# Patient Record
Sex: MALE | Race: WHITE | NOT HISPANIC OR LATINO | Employment: OTHER | URBAN - METROPOLITAN AREA
[De-identification: names, ages, dates, MRNs, and addresses within clinical notes are randomized per-mention and may not be internally consistent; named-entity substitution may affect disease eponyms.]

---

## 2018-02-16 NOTE — PATIENT DISCUSSION
causing dissimilar images not true diplopia DO NOT recommend Sx at this level of VA and patient age.  Observe.

## 2019-09-06 NOTE — PATIENT DISCUSSION
causing dissimilar images not true diplopia DO NOT recommend Sx at this level of VA and patient age.  Observe.  Pt agrees with this plan does NOT want Sx at this time.

## 2021-11-03 ENCOUNTER — NEW PATIENT COMPREHENSIVE (OUTPATIENT)
Dept: URBAN - METROPOLITAN AREA CLINIC 30 | Facility: CLINIC | Age: 51
End: 2021-11-03

## 2021-11-03 DIAGNOSIS — H04.203: ICD-10-CM

## 2021-11-03 DIAGNOSIS — H02.886: ICD-10-CM

## 2021-11-03 DIAGNOSIS — H02.883: ICD-10-CM

## 2021-11-03 DIAGNOSIS — H53.8: ICD-10-CM

## 2021-11-03 DIAGNOSIS — H35.09: ICD-10-CM

## 2021-11-03 PROCEDURE — 99204 OFFICE O/P NEW MOD 45 MIN: CPT

## 2021-11-03 PROCEDURE — 92250 FUNDUS PHOTOGRAPHY W/I&R: CPT

## 2021-11-03 PROCEDURE — 92015 DETERMINE REFRACTIVE STATE: CPT

## 2021-11-03 ASSESSMENT — KERATOMETRY
OS_K2POWER_DIOPTERS: 47.00
OS_AXISANGLE_DEGREES: 4
OD_K1POWER_DIOPTERS: 45.75
OS_AXISANGLE2_DEGREES: 94
OD_AXISANGLE2_DEGREES: 94
OD_K2POWER_DIOPTERS: 46.00
OD_AXISANGLE_DEGREES: 4
OS_K1POWER_DIOPTERS: 46.75

## 2021-11-03 ASSESSMENT — VISUAL ACUITY
OD_SC: 20/400
OU_SC: 20/200
OU_CC: 20/25
OS_SC: 20/70
OD_CC: 20/20
OS_CC: 20/20
OS_CC: 20/25
OD_CC: 20/40
OS_SC: 20/40
OD_SC: 20/50
OU_CC: 20/20
OU_SC: 20/25

## 2021-11-03 ASSESSMENT — TONOMETRY
OS_IOP_MMHG: 13
OD_IOP_MMHG: 13